# Patient Record
Sex: FEMALE | Race: WHITE | NOT HISPANIC OR LATINO | URBAN - METROPOLITAN AREA
[De-identification: names, ages, dates, MRNs, and addresses within clinical notes are randomized per-mention and may not be internally consistent; named-entity substitution may affect disease eponyms.]

---

## 2020-12-14 ENCOUNTER — WEB ENCOUNTER (OUTPATIENT)
Dept: URBAN - METROPOLITAN AREA CLINIC 113 | Facility: CLINIC | Age: 50
End: 2020-12-14

## 2020-12-14 ENCOUNTER — OFFICE VISIT (OUTPATIENT)
Dept: URBAN - METROPOLITAN AREA CLINIC 113 | Facility: CLINIC | Age: 50
End: 2020-12-14
Payer: COMMERCIAL

## 2020-12-14 ENCOUNTER — LAB OUTSIDE AN ENCOUNTER (OUTPATIENT)
Dept: URBAN - METROPOLITAN AREA CLINIC 113 | Facility: CLINIC | Age: 50
End: 2020-12-14

## 2020-12-14 VITALS
TEMPERATURE: 98.2 F | WEIGHT: 239 LBS | SYSTOLIC BLOOD PRESSURE: 125 MMHG | HEIGHT: 65 IN | RESPIRATION RATE: 18 BRPM | HEART RATE: 92 BPM | BODY MASS INDEX: 39.82 KG/M2 | DIASTOLIC BLOOD PRESSURE: 89 MMHG

## 2020-12-14 DIAGNOSIS — R19.4 CHANGE IN BOWEL HABITS: ICD-10-CM

## 2020-12-14 DIAGNOSIS — R13.10 ESOPHAGEAL DYSPHAGIA: ICD-10-CM

## 2020-12-14 DIAGNOSIS — R13.19 CERVICAL DYSPHAGIA: ICD-10-CM

## 2020-12-14 DIAGNOSIS — R19.7 INTERMITTENT DIARRHEA: ICD-10-CM

## 2020-12-14 DIAGNOSIS — K21.9 GASTROESOPHAGEAL REFLUX DISEASE, UNSPECIFIED WHETHER ESOPHAGITIS PRESENT: ICD-10-CM

## 2020-12-14 PROCEDURE — G8482 FLU IMMUNIZE ORDER/ADMIN: HCPCS | Performed by: INTERNAL MEDICINE

## 2020-12-14 PROCEDURE — 99204 OFFICE O/P NEW MOD 45 MIN: CPT | Performed by: INTERNAL MEDICINE

## 2020-12-14 PROCEDURE — G8427 DOCREV CUR MEDS BY ELIG CLIN: HCPCS | Performed by: INTERNAL MEDICINE

## 2020-12-14 RX ORDER — SODIUM, POTASSIUM,MAG SULFATES 17.5-3.13G
354 ML SOLUTION, RECONSTITUTED, ORAL ORAL ONCE
Qty: 354 MILLILITER | Refills: 0 | OUTPATIENT

## 2020-12-14 RX ORDER — IBUPROFEN 800 MG/1
1 TABLET WITH FOOD OR MILK AS NEEDED TABLET ORAL THREE TIMES A DAY
Status: ACTIVE | COMMUNITY

## 2020-12-14 RX ORDER — OMEPRAZOLE 40 MG/1
1 CAPSULE 30 MINUTES BEFORE MORNING MEAL CAPSULE, DELAYED RELEASE ORAL ONCE A DAY
Status: ACTIVE | COMMUNITY

## 2020-12-14 RX ORDER — DUREZOL 0.5 MG/ML
1 DROP INTO AFFECTED EYE EMULSION OPHTHALMIC TWICE A DAY
Status: ACTIVE | COMMUNITY

## 2020-12-14 RX ORDER — METHOCARBAMOL 500 MG/1
1 TABLET TABLET ORAL
Status: ACTIVE | COMMUNITY

## 2020-12-14 RX ORDER — CHOLECALCIFEROL (VITAMIN D3) 125 MCG
2 CAPSULES CAPSULE ORAL ONCE A DAY
Status: ACTIVE | COMMUNITY

## 2020-12-14 RX ORDER — DULOXETINE 60 MG/1
1 CAPSULE CAPSULE, DELAYED RELEASE PELLETS ORAL TWICE A DAY
Status: ACTIVE | COMMUNITY

## 2020-12-14 RX ORDER — BIOTIN 5 MG
1 CAPSULE WITH A MEAL CAPSULE ORAL ONCE A DAY
Status: ACTIVE | COMMUNITY

## 2020-12-14 RX ORDER — INFLIXIMAB 100 MG/10ML
AS DIRECTED INJECTION, POWDER, LYOPHILIZED, FOR SOLUTION INTRAVENOUS
Status: ACTIVE | COMMUNITY

## 2020-12-14 NOTE — PHYSICAL EXAM CONSTITUTIONAL:
well developed, well nourished , in no acute distress  I have reviewed and confirmed nurses' notes...

## 2020-12-14 NOTE — HPI-TODAY'S VISIT:
This is a 50-year-old female with a history of psoriatic arthritis on Remicade presenting for evaluation of intermittent diarrhea.  She has been on Remicade every 2 months since October 2018.  She has experienced intermittent episodes of diarrhea since beginning this medication.  These episodes have decreased in frequency.  She has normal bowel movements and then will experience constipation reporting 1 or 2 days without a bowel movement followed by 2 days during which she has watery, urgent stools with occasional fecal incontinence.  When she is experiencing diarrhea, she will have 5 bowel movements per day.  These episodes occur every 2 or 3 weeks.  In between, she has normal bowel movements.  She denies associated abdominal pain, red blood per rectum, or melena.  She has a history of acid reflux which is controlled with omeprazole 40 mg daily.  Occasionally, she has difficulty swallowing pasta or rice reporting material lodged in the base of her throat relieved with time.  This has improved since she has been on omeprazole.  She denies any other abdominal symptoms.

## 2020-12-14 NOTE — HPI-OTHER HISTORIES
EGD 10/17/2012 notable for no obvious stricture or blockage.  Due to symptoms of dysphagia and globus she was empirically dilated to 18 mm with a savory dilator.  There were very fine linear furrows and subtle rings it could suggest possible eosinophilic esophagitis status post biopsy.  Normal GE junction.  A polyp in the body of the stomach status post biopsy.  Mild gastritis in the stomach and antrum status post biopsy.  Normal pylorus.  Duodenitis in the duodenal bulb status post biopsy.  Pathology report is unavailable.

## 2020-12-14 NOTE — PHYSICAL EXAM CHEST:
Results released to patient via [facility].    Amrita Rose PA-C   breathing is unlabored, normal breath sounds

## 2020-12-18 PROBLEM — 235595009: Status: ACTIVE | Noted: 2020-12-18

## 2020-12-18 PROBLEM — 129851009: Status: ACTIVE | Noted: 2020-12-14

## 2020-12-18 PROBLEM — 40890009: Status: ACTIVE | Noted: 2020-12-18

## 2020-12-18 PROBLEM — 62315008: Status: ACTIVE | Noted: 2020-12-14

## 2020-12-29 ENCOUNTER — WEB ENCOUNTER (OUTPATIENT)
Dept: URBAN - METROPOLITAN AREA CLINIC 113 | Facility: CLINIC | Age: 50
End: 2020-12-29

## 2020-12-30 ENCOUNTER — OFFICE VISIT (OUTPATIENT)
Dept: URBAN - METROPOLITAN AREA SURGERY CENTER 25 | Facility: SURGERY CENTER | Age: 50
End: 2020-12-30
Payer: COMMERCIAL

## 2020-12-30 ENCOUNTER — CLAIMS CREATED FROM THE CLAIM WINDOW (OUTPATIENT)
Dept: URBAN - METROPOLITAN AREA CLINIC 4 | Facility: CLINIC | Age: 50
End: 2020-12-30
Payer: COMMERCIAL

## 2020-12-30 DIAGNOSIS — K63.89 OTHER SPECIFIED DISEASES OF INTESTINE: ICD-10-CM

## 2020-12-30 DIAGNOSIS — K52.9 ACUTE COLITIS: ICD-10-CM

## 2020-12-30 PROCEDURE — 88342 IMHCHEM/IMCYTCHM 1ST ANTB: CPT | Performed by: PATHOLOGY

## 2020-12-30 PROCEDURE — G9937 DIG OR SURV COLSCO: HCPCS | Performed by: INTERNAL MEDICINE

## 2020-12-30 PROCEDURE — 88305 TISSUE EXAM BY PATHOLOGIST: CPT | Performed by: PATHOLOGY

## 2020-12-30 PROCEDURE — 45380 COLONOSCOPY AND BIOPSY: CPT | Performed by: INTERNAL MEDICINE

## 2020-12-30 PROCEDURE — G8907 PT DOC NO EVENTS ON DISCHARG: HCPCS | Performed by: INTERNAL MEDICINE

## 2020-12-30 PROCEDURE — 88313 SPECIAL STAINS GROUP 2: CPT | Performed by: PATHOLOGY

## 2020-12-30 RX ORDER — DUREZOL 0.5 MG/ML
1 DROP INTO AFFECTED EYE EMULSION OPHTHALMIC TWICE A DAY
Status: ACTIVE | COMMUNITY

## 2020-12-30 RX ORDER — DULOXETINE 60 MG/1
1 CAPSULE CAPSULE, DELAYED RELEASE PELLETS ORAL TWICE A DAY
Status: ACTIVE | COMMUNITY

## 2020-12-30 RX ORDER — CHOLECALCIFEROL (VITAMIN D3) 125 MCG
2 CAPSULES CAPSULE ORAL ONCE A DAY
Status: ACTIVE | COMMUNITY

## 2020-12-30 RX ORDER — METHOCARBAMOL 500 MG/1
1 TABLET TABLET ORAL
Status: ACTIVE | COMMUNITY

## 2020-12-30 RX ORDER — OMEPRAZOLE 40 MG/1
1 CAPSULE 30 MINUTES BEFORE MORNING MEAL CAPSULE, DELAYED RELEASE ORAL ONCE A DAY
Status: ACTIVE | COMMUNITY

## 2020-12-30 RX ORDER — INFLIXIMAB 100 MG/10ML
AS DIRECTED INJECTION, POWDER, LYOPHILIZED, FOR SOLUTION INTRAVENOUS
Status: ACTIVE | COMMUNITY

## 2020-12-30 RX ORDER — SODIUM, POTASSIUM,MAG SULFATES 17.5-3.13G
354 ML SOLUTION, RECONSTITUTED, ORAL ORAL ONCE
Qty: 354 MILLILITER | Refills: 0 | Status: ACTIVE | COMMUNITY

## 2020-12-30 RX ORDER — BIOTIN 5 MG
1 CAPSULE WITH A MEAL CAPSULE ORAL ONCE A DAY
Status: ACTIVE | COMMUNITY

## 2020-12-30 RX ORDER — IBUPROFEN 800 MG/1
1 TABLET WITH FOOD OR MILK AS NEEDED TABLET ORAL THREE TIMES A DAY
Status: ACTIVE | COMMUNITY

## 2021-01-14 ENCOUNTER — OFFICE VISIT (OUTPATIENT)
Dept: URBAN - METROPOLITAN AREA CLINIC 113 | Facility: CLINIC | Age: 51
End: 2021-01-14
Payer: COMMERCIAL

## 2021-01-14 VITALS
SYSTOLIC BLOOD PRESSURE: 147 MMHG | BODY MASS INDEX: 40.32 KG/M2 | TEMPERATURE: 97.8 F | HEART RATE: 88 BPM | WEIGHT: 242 LBS | DIASTOLIC BLOOD PRESSURE: 100 MMHG | HEIGHT: 65 IN | RESPIRATION RATE: 18 BRPM

## 2021-01-14 DIAGNOSIS — R19.4 CHANGE IN BOWEL HABITS: ICD-10-CM

## 2021-01-14 DIAGNOSIS — K21.9 GASTROESOPHAGEAL REFLUX DISEASE, UNSPECIFIED WHETHER ESOPHAGITIS PRESENT: ICD-10-CM

## 2021-01-14 DIAGNOSIS — R19.7 INTERMITTENT DIARRHEA: ICD-10-CM

## 2021-01-14 DIAGNOSIS — R13.10 ESOPHAGEAL DYSPHAGIA: ICD-10-CM

## 2021-01-14 PROCEDURE — G8427 DOCREV CUR MEDS BY ELIG CLIN: HCPCS | Performed by: INTERNAL MEDICINE

## 2021-01-14 PROCEDURE — 99213 OFFICE O/P EST LOW 20 MIN: CPT | Performed by: INTERNAL MEDICINE

## 2021-01-14 PROCEDURE — 1036F TOBACCO NON-USER: CPT | Performed by: INTERNAL MEDICINE

## 2021-01-14 PROCEDURE — G9903 PT SCRN TBCO ID AS NON USER: HCPCS | Performed by: INTERNAL MEDICINE

## 2021-01-14 PROCEDURE — G8482 FLU IMMUNIZE ORDER/ADMIN: HCPCS | Performed by: INTERNAL MEDICINE

## 2021-01-14 RX ORDER — BIOTIN 5 MG
1 CAPSULE WITH A MEAL CAPSULE ORAL ONCE A DAY
Status: ACTIVE | COMMUNITY

## 2021-01-14 RX ORDER — INFLIXIMAB 100 MG/10ML
AS DIRECTED INJECTION, POWDER, LYOPHILIZED, FOR SOLUTION INTRAVENOUS
Status: ACTIVE | COMMUNITY

## 2021-01-14 RX ORDER — DULOXETINE 60 MG/1
1 CAPSULE CAPSULE, DELAYED RELEASE PELLETS ORAL TWICE A DAY
Status: ACTIVE | COMMUNITY

## 2021-01-14 RX ORDER — CHOLECALCIFEROL (VITAMIN D3) 125 MCG
2 CAPSULES CAPSULE ORAL ONCE A DAY
Status: ACTIVE | COMMUNITY

## 2021-01-14 RX ORDER — DUREZOL 0.5 MG/ML
1 DROP INTO AFFECTED EYE EMULSION OPHTHALMIC TWICE A DAY
Status: ACTIVE | COMMUNITY

## 2021-01-14 RX ORDER — METHOCARBAMOL 500 MG/1
1 TABLET TABLET ORAL
Status: ACTIVE | COMMUNITY

## 2021-01-14 RX ORDER — IBUPROFEN 800 MG/1
1 TABLET WITH FOOD OR MILK AS NEEDED TABLET ORAL THREE TIMES A DAY
Status: ACTIVE | COMMUNITY

## 2021-01-14 RX ORDER — OMEPRAZOLE 40 MG/1
1 CAPSULE 30 MINUTES BEFORE MORNING MEAL CAPSULE, DELAYED RELEASE ORAL ONCE A DAY
Status: ACTIVE | COMMUNITY

## 2021-01-14 NOTE — HPI-TODAY'S VISIT:
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    She was initially seen 12/14/2020.  She reported intermittent episodes of diarrhea beginning in 2018 when she started Remicade.  The episodes were decreasing in frequency.  She reported normal bowel movements with constipation described as 1 or 2 days without a bowel movement followed by 2 days during which she had a urgent, watery stools with occasional fecal incontinence.  These episodes were occurring every 2 or 3 weeks.  She denied associated pain or red blood per rectum.  Acid reflux was controlled with omeprazole 40 mg daily.  She occasionally had difficulty swallowing rice or pasta describing material lodging in the base of her throat relieved with time.  EGD in 2012 was negative for a stricture or blockage.  She was empirically dilated because of symptoms.  There were fine linear first and subtle rings suggesting possible eosinophilic esophagitis status post biopsy.  The pathology report was unavailable and was requested.  She was scheduled for a colonoscopy and instructed to begin daily Benefiber.  She was to continue omeprazole 40 mg daily   Colonoscopy 12/30/2020: BBPS 9, sigmoid/descending/transverse/a sending diverticulosis, grade 2 nonbleeding internal hemorrhoids, normal colonic mucosa status post random biopsies demonstrating no significant abnormality.  She has increased the fiber in her diet and is not on a fiber supplement.  She has not had an episode of diarrhea since her last visit.  Acid reflux remains controlled with omeprazole 40 mg daily.  Dysphagia is not a problem. She denies any other abdominal symptoms. She continues Remicade 5 mg/kg every 8 weeks for psoriatic arthritis.  She is undergoing prior approval for decreasing interval to every 6 weeks as she is symptomatic prior to infusions.  She was recently prescribed methotrexate.  After single dose, she developed  significant dizziness.  She is no longer taking the medication.

## 2021-01-18 ENCOUNTER — DASHBOARD ENCOUNTERS (OUTPATIENT)
Age: 51
End: 2021-01-18